# Patient Record
Sex: FEMALE | Race: OTHER | Employment: FULL TIME | URBAN - METROPOLITAN AREA
[De-identification: names, ages, dates, MRNs, and addresses within clinical notes are randomized per-mention and may not be internally consistent; named-entity substitution may affect disease eponyms.]

---

## 2022-12-06 ENCOUNTER — HOSPITAL ENCOUNTER (OUTPATIENT)
Age: 42
Discharge: HOME | End: 2022-12-06
Payer: COMMERCIAL

## 2022-12-06 VITALS
TEMPERATURE: 97 F | OXYGEN SATURATION: 99 % | SYSTOLIC BLOOD PRESSURE: 157 MMHG | DIASTOLIC BLOOD PRESSURE: 87 MMHG | RESPIRATION RATE: 16 BRPM | HEART RATE: 87 BPM

## 2022-12-06 DIAGNOSIS — N30.90 CYSTITIS: Primary | ICD-10-CM

## 2022-12-06 LAB
B-HCG UR QL: NEGATIVE
CLARITY UR: CLEAR
CLARITY UR: CLEAR
GLUCOSE UR STRIP-MCNC: NEGATIVE MG/DL
GLUCOSE UR STRIP-MCNC: NEGATIVE MG/DL
KETONES UR STRIP-MCNC: 15 MG/DL
KETONES UR STRIP-MCNC: 15 MG/DL
NITRITE UR QL STRIP: POSITIVE
NITRITE UR QL STRIP: POSITIVE
PH UR STRIP: 5.5 [PH]
PH UR STRIP: 6 [PH]
PROT UR STRIP-MCNC: >=300 MG/DL
PROT UR STRIP-MCNC: >=300 MG/DL
SP GR UR STRIP: 1.02
SP GR UR STRIP: 1.02
UROBILINOGEN UR STRIP-ACNC: 4 MG/DL
UROBILINOGEN UR STRIP-ACNC: 4 MG/DL

## 2022-12-06 PROCEDURE — 99203 OFFICE O/P NEW LOW 30 MIN: CPT | Performed by: NURSE PRACTITIONER

## 2022-12-06 PROCEDURE — 81025 URINE PREGNANCY TEST: CPT | Performed by: NURSE PRACTITIONER

## 2022-12-06 PROCEDURE — 81002 URINALYSIS NONAUTO W/O SCOPE: CPT | Performed by: NURSE PRACTITIONER

## 2022-12-06 RX ORDER — CEFADROXIL 500 MG/1
500 CAPSULE ORAL 2 TIMES DAILY
Qty: 14 CAPSULE | Refills: 0 | Status: SHIPPED | OUTPATIENT
Start: 2022-12-06 | End: 2022-12-13

## 2022-12-06 RX ORDER — PHENAZOPYRIDINE HYDROCHLORIDE 200 MG/1
200 TABLET, FILM COATED ORAL 3 TIMES DAILY PRN
Qty: 6 TABLET | Refills: 0 | Status: SHIPPED | OUTPATIENT
Start: 2022-12-06 | End: 2022-12-13

## 2022-12-06 NOTE — DISCHARGE INSTRUCTIONS
Push fluids. Rest. Take the medications as prescribed. Follow up with your doctor. Return for any concerns.

## 2022-12-06 NOTE — ED INITIAL ASSESSMENT (HPI)
Pt reports hematuria, urinary frequency and urgency. C/o pain to lower pelvic pain. Symptoms started this morning.  Denies fevers

## 2022-12-06 NOTE — ED QUICK NOTES
Glucose negative  Bilirubin moderate  Ketone 15 mg/dl  Specific gravity 1.025  Blood large  Ph 6  Protein >300 mg/dl  urobilirubin 4.0 eu/dl  Nitrates positive  Leukocytes large

## 2024-01-16 ENCOUNTER — HOSPITAL ENCOUNTER (OUTPATIENT)
Age: 44
Discharge: HOME | End: 2024-01-16
Payer: COMMERCIAL

## 2024-01-16 VITALS
TEMPERATURE: 98 F | WEIGHT: 220 LBS | SYSTOLIC BLOOD PRESSURE: 157 MMHG | OXYGEN SATURATION: 100 % | HEIGHT: 64 IN | RESPIRATION RATE: 20 BRPM | HEART RATE: 82 BPM | DIASTOLIC BLOOD PRESSURE: 87 MMHG | BODY MASS INDEX: 37.56 KG/M2

## 2024-01-16 DIAGNOSIS — N30.01 ACUTE CYSTITIS WITH HEMATURIA: Primary | ICD-10-CM

## 2024-01-16 LAB
B-HCG UR QL: NEGATIVE
BILIRUB UR QL STRIP: NEGATIVE
GLUCOSE UR STRIP-MCNC: NEGATIVE MG/DL
KETONES UR STRIP-MCNC: NEGATIVE MG/DL
NITRITE UR QL STRIP: POSITIVE
PH UR STRIP: 6 [PH]
PROT UR STRIP-MCNC: >=300 MG/DL
SP GR UR STRIP: >=1.03
UROBILINOGEN UR STRIP-ACNC: <2 MG/DL

## 2024-01-16 PROCEDURE — 81025 URINE PREGNANCY TEST: CPT | Performed by: NURSE PRACTITIONER

## 2024-01-16 PROCEDURE — 81002 URINALYSIS NONAUTO W/O SCOPE: CPT | Performed by: NURSE PRACTITIONER

## 2024-01-16 PROCEDURE — 99213 OFFICE O/P EST LOW 20 MIN: CPT | Performed by: NURSE PRACTITIONER

## 2024-01-16 RX ORDER — FLUCONAZOLE 150 MG/1
150 TABLET ORAL ONCE
Qty: 1 TABLET | Refills: 0 | Status: SHIPPED | OUTPATIENT
Start: 2024-01-16 | End: 2024-01-16

## 2024-01-16 RX ORDER — CEPHALEXIN 500 MG/1
500 CAPSULE ORAL 2 TIMES DAILY
Qty: 14 CAPSULE | Refills: 0 | Status: SHIPPED | OUTPATIENT
Start: 2024-01-16 | End: 2024-01-23

## 2024-01-16 NOTE — DISCHARGE INSTRUCTIONS
As discussed, you have a bladder infection.  Urine sent for culture, results available in 48 hours.  So we will contact you if antibiotics warrant changing.  In the meantime, begin antibiotics today.  Take with food and water.  You will take antibiotics twice a day for 7 days.  Increase your fluid intake to either 80 ounces of water 100 ounces of water.  I prescribed fluconazole which you can take to prevent yeast infection.  You may take over-the-counter Azo for urinary frequency and dysuria.  Please be aware this medication will make your urine turn bright orange.  You may use up to 3 times a day, however, I do not recommend to use that medication for more than 3 days.    If you have any worsening urinary symptoms while on antibiotics such as retention, blood in urine, abdominal pain, flank pain, back pain, nausea, vomiting, fever, chills, fatigue, please go to emergency room.

## 2024-01-16 NOTE — ED PROVIDER NOTES
Patient Seen in: [facility]      History     Chief Complaint   Patient presents with    Urinary Symptoms     Stated Complaint: Eval -G    Subjective: This is a 43-year-old female, no significant past medical history, presents to immediate care clinic for evaluation of urinary frequency and burning that started around 3:30 AM this morning.  Patient without abdominal pain, flank pain, back pain.  She denies any urinary retention or hematuria.  No vaginal itching, discharge, pelvic pain.  No fever, chills, fatigue.  Denies nausea, vomiting, diarrhea.  Patient is well-appearing.  Not ill or toxic appearing. Aox4  The history is provided by the patient.           Objective:   History reviewed. No pertinent past medical history.           History reviewed. No pertinent surgical history.             Social History     Socioeconomic History    Marital status: Single              Review of Systems   Constitutional: Negative.  Negative for chills, fatigue and fever.   HENT: Negative.     Eyes: Negative.    Respiratory: Negative.     Cardiovascular: Negative.    Gastrointestinal: Negative.    Genitourinary: Negative.    Musculoskeletal: Negative.    Skin: Negative.    Neurological: Negative.        Positive for stated complaint: Eval -G  Other systems are as noted in HPI.  Constitutional and vital signs reviewed.      All other systems reviewed and negative except as noted above.    Physical Exam     ED Triage Vitals   BP 01/16/24 0918 157/87   Pulse 01/16/24 0918 82   Resp 01/16/24 0918 20   Temp 01/16/24 0940 98.2 °F (36.8 °C)   Temp src 01/16/24 0940 Temporal   SpO2 01/16/24 0918 100 %   O2 Device 01/16/24 0918 None (Room air)       Current:/87   Pulse 82   Temp 98.2 °F (36.8 °C) (Temporal)   Resp 20   Ht 162.6 cm (5' 4\")   Wt 99.8 kg   LMP 01/15/2024   SpO2 100%   BMI 37.76 kg/m²         Physical Exam  Constitutional:       General: She is not in acute distress.     Appearance: Normal  appearance. She is not ill-appearing.   HENT:      Head: Normocephalic.   Pulmonary:      Effort: Pulmonary effort is normal.   Abdominal:      General: Abdomen is flat.      Tenderness: There is abdominal tenderness in the suprapubic area. There is no right CVA tenderness or left CVA tenderness.   Musculoskeletal:         General: Normal range of motion.   Skin:     General: Skin is warm.      Capillary Refill: Capillary refill takes less than 2 seconds.   Neurological:      General: No focal deficit present.      Mental Status: She is alert and oriented to person, place, and time.               ED Course     Labs Reviewed   [facility] POCT URINALYSIS DIPSTICK - Abnormal; Notable for the following components:       Result Value    Urine Color Elsa (*)     Urine Clarity Cloudy (*)     Protein urine >=300 (*)     Blood, Urine Large (*)     Nitrite Urine Positive (*)     Leukocyte esterase urine Small (*)     All other components within normal limits   POCT PREGNANCY URINE - Normal   URINE CULTURE, ROUTINE                      MDM      This is a 43-year-old female, presents to immediate care with urinary frequency and burning that started around 3:30 AM this morning.  Patient denies hematuria or retention.  She is without flank pain or back pain.  No fever, chills, fatigue.  No CVA tenderness to percussion.    Very low suspicion for nephrolithiasis, obstructive uropathy, pyelonephritis.    Urine sample provided positive for nitrates, blood, leukocytes.  Will send for culture.  High clinical suspicion for acute cystitis.  Will treat with Keflex and fluconazole.  Patient is aware that urine culture results will be available in 48 hours.  She is aware to increase her fluid intake to at least 80 to 100 ounces of water a day.  Patient aware she can take Azo over-the-counter, however, do not use this medication for more than 3 days.  She is aware that it will make your urine turn bright orange.    Educated patient on signs  symptoms that warrant immediate ER evaluation.  She verbalized understanding agrees with plan of care.                                   Medical Decision Making      Disposition and Plan     Clinical Impression:  1. Acute cystitis with hematuria         Disposition:  Discharge  1/16/2024  9:39 am    Follow-up:  Nonstaff, Physician  [address] [place] [address]          Quita Coffman DO  [address] [place] [address]  [phone]      As needed          Medications Prescribed:  Discharge Medication List as of 1/16/2024  9:41 AM        START taking these medications    Details   cephalexin 500 MG Oral Cap Take 1 capsule (500 mg total) by mouth 2 (two) times daily for 7 days., Normal, Disp-14 capsule, R-0      fluconazole 150 MG Oral Tab Take 1 tablet (150 mg total) by mouth once for 1 dose., Normal, Disp-1 tablet, R-0

## 2024-03-13 ENCOUNTER — APPOINTMENT (OUTPATIENT)
Dept: CT IMAGING | Age: 44
End: 2024-03-13
Attending: PHYSICIAN ASSISTANT
Payer: COMMERCIAL

## 2024-03-13 ENCOUNTER — HOSPITAL ENCOUNTER (OUTPATIENT)
Age: 44
Discharge: HOME | End: 2024-03-13
Payer: COMMERCIAL

## 2024-03-13 VITALS
SYSTOLIC BLOOD PRESSURE: 155 MMHG | OXYGEN SATURATION: 100 % | TEMPERATURE: 97 F | HEART RATE: 86 BPM | DIASTOLIC BLOOD PRESSURE: 95 MMHG | RESPIRATION RATE: 18 BRPM

## 2024-03-13 DIAGNOSIS — R03.0 ELEVATED BLOOD PRESSURE READING: ICD-10-CM

## 2024-03-13 DIAGNOSIS — R51.9 ACUTE NONINTRACTABLE HEADACHE, UNSPECIFIED HEADACHE TYPE: Primary | ICD-10-CM

## 2024-03-13 PROCEDURE — 99213 OFFICE O/P EST LOW 20 MIN: CPT | Performed by: PHYSICIAN ASSISTANT

## 2024-03-13 PROCEDURE — 70450 CT HEAD/BRAIN W/O DYE: CPT | Performed by: PHYSICIAN ASSISTANT

## 2024-03-13 RX ORDER — BUTALBITAL, ACETAMINOPHEN AND CAFFEINE 50; 325; 40 MG/1; MG/1; MG/1
1-2 TABLET ORAL EVERY 6 HOURS PRN
Qty: 14 TABLET | Refills: 0 | Status: SHIPPED | OUTPATIENT
Start: 2024-03-13 | End: 2024-03-20

## 2024-03-13 NOTE — ED PROVIDER NOTES
Patient Seen in: [facility]    History     Chief Complaint   Patient presents with    Headache     Stated Complaint: migrain, high blood pressure    HPI    42-year-old female presents with chief complaint of right frontal headache.  Onset 1 week ago.  Patient reports associated elevated blood pressure readings over the past 4 days.  Patient denies history of headaches or migraines.  Patient denies acute onset of headache.  Patient denies worst headache of her life.  Pain scale 6 out of 10.  Patient denies fever, chills, neck pain, restricted neck movement, neck swelling, injury or trauma, altered mental status, amnesia, weakness, paresthesias, vision changes, photophobia, nausea, vomiting.     History reviewed. No pertinent past medical history.    Past Surgical History:   Procedure Laterality Date     DELIVERY ONLY      REDUCTION OF LARGE BREAST      \"and tummy tuck\"            No family history on file.    Social History     Socioeconomic History    Marital status: Single   Tobacco Use    Smoking status: Never    Smokeless tobacco: Never   Vaping Use    Vaping Use: Never used   Substance and Sexual Activity    Alcohol use: Yes     Comment: socially    Drug use: Never       Review of Systems    Positive for stated complaint: migrain, high blood pressure  Other systems are as noted in HPI.  Constitutional and vital signs reviewed.      All other systems reviewed and negative except as noted above.    PSFH elements reviewed from today and agreed except as otherwise stated in HPI.    Physical Exam     ED Triage Vitals [24 1635]   BP (!) 164/94   Pulse 86   Resp 18   Temp 97.4 °F (36.3 °C)   Temp src Temporal   SpO2 100 %   O2 Device None (Room air)       Current:BP (!) 155/95   Pulse 86   Temp 97.4 °F (36.3 °C) (Temporal)   Resp 18   LMP 02/10/2024 (Approximate)   SpO2 100%     PULSE OX within normal limits on room air as interpreted by this provider.    Constitutional: The patient is  cooperative. Appears well-developed and well-nourished.  Mild discomfort.  Psychological: Alert, No abnormalities of mood, affect.  Head: Normocephalic/atraumatic.   Eyes: Pupils are equal round reactive to light. Conjunctiva are within normal limits. Extraocular motions intact bilaterally.    ENT: Oropharynx is clear.  TMs within normal limits bilaterally.  Neck: The neck is supple. No meningeal signs. Trachea normal. Nontender to palpation. No contusions. No abrasions. No penetrating injury.  Respiratory: Respiratory effort was normal. There is no stridor. Air entry is equal.  Cardiovascular: Regular rate and rhythm. Capillary refill is brisk.  Genitourinary: Not examined.  Lymphatic: No gross lymphadenopathy noted.  Musculoskeletal: Musculoskeletal system is grossly intact. There is no obvious deformity.  Neurological: No facial asymmetry. Sensation intact to light touch. Strength and range of motion symmetrical of all extremities x4. Patient exhibits normal speech.  Normal gait.  No limb ataxia.  Skin: Skin is normal to inspection and palpation. Warm and dry. No obvious bruising. No obvious rash.          ED Course   Labs Reviewed - No data to display    MDM     Radiology findings: CT BRAIN OR HEAD (03207)    Result Date: 3/13/2024  CONCLUSION:  1. No acute intracranial process by noncontrast CT technique. 2. Please note that streak artifacts from the patient's metallic hair jossy and a metallic right earring limit evaluation.   Dictated by (CST): Neftali Donis MD on 3/13/2024 at 6:16 PM     Finalized by (CST): Neftali Donis MD on 3/13/2024 at 6:17 PM           Physical exam remained stable as previously documented.  Neuroexam stable.  Available results reviewed with patient.    I have given the patient instructions regarding their diagnoses, expectations, follow up, and ER precautions. I explained to the patient that emergent conditions may arise and to go to the ER for new, worsening or any persistent  conditions. I've explained the importance of following up with their doctor as instructed. The patient verbalized understanding of the discharge instructions and plan.    The patient was informed of their elevated blood pressure reading at immediate care.  They were informed of the dangers of undiagnosed and untreated hypertension.  Education regarding lifestyle modifications and the need for appropriate follow-up with their PCP to have their blood pressure re-checked within 24-48 hours was provided.    Disposition and Plan     Clinical Impression:  1. Acute nonintractable headache, unspecified headache type    2. Elevated blood pressure reading        Disposition:  Discharge    Follow-up:  Your primary care physician    Call in 1 day  For follow-up    Heath Duncan MD  [address] [facility] [address]  [facility] [id number] [phone]    Call in 1 day  For follow-up    Dangelo Hernandez DO  [address] [facility] [address]  [facility] [id number] [phone]    Call in 1 day  For follow-up      Medications Prescribed:  Current Discharge Medication List        START taking these medications    Details   butalbital-acetaminophen-caffeine -40 MG Oral Tab Take 1-2 tablets by mouth every 6 (six) hours as needed for Headaches.  Qty: 14 tablet, Refills: 0    Comments: Marcy Ann MD  Associated Diagnoses: Acute nonintractable headache, unspecified headache type

## 2024-03-13 NOTE — ED INITIAL ASSESSMENT (HPI)
Pt with headaches x1 week and noted high blood pressure after taking it x4 days at home. Pt stated her blood pressure has been \"running 170/90\" at home. Pt rated head pain 6/10. Pt took 2 excedrin 1 hour PTA.

## 2024-06-19 ENCOUNTER — HOSPITAL ENCOUNTER (OUTPATIENT)
Age: 44
Discharge: HOME | End: 2024-06-19

## 2024-06-19 VITALS
DIASTOLIC BLOOD PRESSURE: 74 MMHG | TEMPERATURE: 97 F | SYSTOLIC BLOOD PRESSURE: 127 MMHG | OXYGEN SATURATION: 100 % | HEART RATE: 84 BPM | RESPIRATION RATE: 18 BRPM

## 2024-06-19 DIAGNOSIS — R21 RASH AND NONSPECIFIC SKIN ERUPTION: Primary | ICD-10-CM

## 2024-06-19 PROCEDURE — 99213 OFFICE O/P EST LOW 20 MIN: CPT | Performed by: NURSE PRACTITIONER

## 2024-06-19 RX ORDER — TRIAMCINOLONE ACETONIDE 1 MG/G
CREAM TOPICAL 2 TIMES DAILY
Qty: 45 G | Refills: 0 | Status: SHIPPED | OUTPATIENT
Start: 2024-06-19 | End: 2024-06-26

## 2024-06-19 RX ORDER — PERMETHRIN 50 MG/G
1 CREAM TOPICAL ONCE
Qty: 60 G | Refills: 0 | Status: SHIPPED | OUTPATIENT
Start: 2024-06-19 | End: 2024-06-19

## 2024-06-19 NOTE — DISCHARGE INSTRUCTIONS
Premetherin cream as discussed   Triamcinolone cream twice a day for 7 days  Zyrtec 1 tablet twice a day for 7 days  Follow-up with dermatology if no improvement

## 2024-06-19 NOTE — ED PROVIDER NOTES
Chief Complaint   Patient presents with    Rash       HPI:     Mikayla May is a 43 year old female who presents today with a chief complaint of itchy rash to the right upper back, and right upper rib cage area ongoing for 5 days.  Rash is not painful.  No fever.  No new soaps, lotions, detergents.  No recent travel or stays at hotels or motels.  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 No recent swimming or hot tubs    PFSH      PFSH asessment screens reviewed and agree.  Nurses notes reviewed I agree with documentation.    No family history on file.  Family history reviewed with patient/caregiver and is not pertinent to presenting problem.  Social History     Socioeconomic History    Marital status: Single     Spouse name: Not on file    Number of children: Not on file    Years of education: Not on file    Highest education level: Not on file   Occupational History    Not on file   Tobacco Use    Smoking status: Never    Smokeless tobacco: Never   Vaping Use    Vaping status: Never Used   Substance and Sexual Activity    Alcohol use: Yes     Comment: socially    Drug use: Never    Sexual activity: Not on file   Other Topics Concern    Not on file   Social History Narrative    Not on file     Social Determinants of Health     Financial Resource Strain: Not on file   Food Insecurity: Not on file   Transportation Needs: Not on file   Physical Activity: Not on file   Stress: Not on file   Social Connections: Not on file   Housing Stability: Not on file         ROS:   Positive for stated complaint: rash  All other systems reviewed and negative except as noted above.  Constitutional and Vital Signs Reviewed.      Physical Exam:     Rash:    Scattered erythematous papules to the right upper back, and right upper rib cage area.  No vesicles.  Areas are nontender.    Physical Exam:  /74   Pulse 84   Temp 97 °F (36.1 °C) (Temporal)   Resp 18   LMP 02/10/2024  (Approximate)   SpO2 100%   GENERAL: well developed, well nourished, well hydrated, no distress  SKIN: good skin turgor, see above description for rash  HEENT: atraumatic, normocephalic, ears, nose and throat are clear  EYES: sclera non icteric bilateral  NECK: supple, no adenopathy  LUNGS: clear to auscultation, no RRW  CARDIO: RRR without murmur  GI: soft, non-tender, normal bowel sounds  EXTREMITIES: no cyanosis or edema. GIBSON without difficulty.    MDM/Assessment/Plan:   Orders for this encounter:    Orders Placed This Encounter    permethrin 5 % External Cream     Sig: Apply 1 Application topically once for 1 dose.     Dispense:  60 g     Refill:  0    triamcinolone 0.1 % External Cream     Sig: Apply topically 2 (two) times daily for 7 days.     Dispense:  45 g     Refill:  0       Labs performed this visit:  No results found for this or any previous visit (from the past 10 hour(s)).    MDM:  Medical Decision Making  Differentials considered: Scabies versus folliculitis versus allergic dermatitis versus contact dermatitis versus other    Unclear etiology of rash.  Will treat for scabies with permethrin given recent exposure although not clearly a scabies rash.  Also consider folliculitis versus contact dermatitis.  Does not appear to be consistent with Pseudomonas related folliculitis.  Will start triamcinolone, and Zyrtec.  She was advised to follow-up with dermatology if no improvement.  Return for any worsening symptoms.  Patient verbalized understanding and agreeable to plan of care.    Risk  OTC drugs.  Prescription drug management.          Diagnosis:    ICD-10-CM    1. Rash and nonspecific skin eruption  R21           All results reviewed and discussed with patient.  See AVS for detailed discharge instructions for your condition today.    Follow Up with:  Mary Coffman MD  [address] [place] [id number]  [phone]    Call

## (undated) NOTE — LETTER
Date & Time: 6/19/2024, 3:52 PM  Patient: Mikayla May  Encounter Provider(s):    Lucila Whittaker APRN       To Whom It May Concern:    Mikayla May was seen and treated in our department on 6/19/2024. Please excuser her from work today and tomorrow 6/20/24.     If you have any questions or concerns, please do not hesitate to call.      NOELLE ZepedaP-BC  _____________________________  Physician/[facility] Signature